# Patient Record
Sex: FEMALE | Race: WHITE | Employment: FULL TIME | URBAN - METROPOLITAN AREA
[De-identification: names, ages, dates, MRNs, and addresses within clinical notes are randomized per-mention and may not be internally consistent; named-entity substitution may affect disease eponyms.]

---

## 2018-07-19 ENCOUNTER — OFFICE VISIT (OUTPATIENT)
Dept: PRIMARY CARE CLINIC | Age: 68
End: 2018-07-19

## 2018-07-19 VITALS
DIASTOLIC BLOOD PRESSURE: 80 MMHG | TEMPERATURE: 99.7 F | RESPIRATION RATE: 16 BRPM | WEIGHT: 166 LBS | HEART RATE: 97 BPM | BODY MASS INDEX: 27.66 KG/M2 | HEIGHT: 65 IN | SYSTOLIC BLOOD PRESSURE: 128 MMHG

## 2018-07-19 DIAGNOSIS — G47.09 OTHER INSOMNIA: ICD-10-CM

## 2018-07-19 DIAGNOSIS — J40 BRONCHITIS: Primary | ICD-10-CM

## 2018-07-19 DIAGNOSIS — I10 ESSENTIAL HYPERTENSION: ICD-10-CM

## 2018-07-19 PROBLEM — J06.9 VIRAL UPPER RESPIRATORY TRACT INFECTION: Status: ACTIVE | Noted: 2018-07-19

## 2018-07-19 PROCEDURE — 99213 OFFICE O/P EST LOW 20 MIN: CPT | Performed by: NURSE PRACTITIONER

## 2018-07-19 RX ORDER — TRAZODONE HYDROCHLORIDE 50 MG/1
50 TABLET ORAL NIGHTLY PRN
COMMUNITY
End: 2018-07-19 | Stop reason: SDUPTHER

## 2018-07-19 RX ORDER — TRAZODONE HYDROCHLORIDE 50 MG/1
50 TABLET ORAL NIGHTLY PRN
Qty: 90 TABLET | Refills: 1 | Status: SHIPPED | OUTPATIENT
Start: 2018-07-19 | End: 2019-04-02 | Stop reason: SDUPTHER

## 2018-07-19 RX ORDER — DOXYCYCLINE HYCLATE 100 MG
100 TABLET ORAL 2 TIMES DAILY
Qty: 20 TABLET | Refills: 0 | Status: SHIPPED | OUTPATIENT
Start: 2018-07-19 | End: 2018-07-29

## 2018-07-19 RX ORDER — AMLODIPINE BESYLATE AND BENAZEPRIL HYDROCHLORIDE 10; 20 MG/1; MG/1
1 CAPSULE ORAL DAILY
Qty: 90 CAPSULE | Refills: 1 | Status: SHIPPED | OUTPATIENT
Start: 2018-07-19 | End: 2019-04-02

## 2018-07-19 ASSESSMENT — ENCOUNTER SYMPTOMS
COUGH: 1
SINUS PAIN: 1
SHORTNESS OF BREATH: 0
NAUSEA: 0
SINUS PRESSURE: 1
BACK PAIN: 1
RHINORRHEA: 1
TROUBLE SWALLOWING: 0
DIARRHEA: 0
SORE THROAT: 0
CHEST TIGHTNESS: 0

## 2018-07-19 ASSESSMENT — PATIENT HEALTH QUESTIONNAIRE - PHQ9
SUM OF ALL RESPONSES TO PHQ QUESTIONS 1-9: 0
1. LITTLE INTEREST OR PLEASURE IN DOING THINGS: 0
SUM OF ALL RESPONSES TO PHQ9 QUESTIONS 1 & 2: 0
2. FEELING DOWN, DEPRESSED OR HOPELESS: 0

## 2018-07-19 NOTE — LETTER
[address] [address]  Phone: [phone]  Fax: [phone]    CANDIE Heller CNP        July 19, 2018     Patient: Christi Mclaughlin   YOB: 1950   Date of Visit: 7/19/2018       To Whom It May Concern: It is my medical opinion that Carlitos Constantino may return to work on 7/20/18. If you have any questions or concerns, please don't hesitate to call.     Sincerely,          CANDIE Heller CNP

## 2018-07-19 NOTE — PROGRESS NOTES
Marvin, DO   predniSONE (DELTASONE) 10 MG tablet Take 1, 2 or 3 tablets daily  Anup Balderas, DO     Current Outpatient Prescriptions   Medication Sig Dispense Refill    doxycycline hyclate (VIBRA-TABS) 100 MG tablet Take 1 tablet by mouth 2 times daily for 10 days 20 tablet 0    amLODIPine-benazepril (LOTREL) 10-20 MG per capsule Take 1 capsule by mouth daily 90 capsule 1    traZODone (DESYREL) 50 MG tablet Take 1 tablet by mouth nightly as needed for Sleep 90 tablet 1    Esomeprazole Magnesium (NEXIUM PO) Take  by mouth.  Ibuprofen (MOTRIN PO) Take 200 mg by mouth daily.  diclofenac (VOLTAREN) 75 MG EC tablet Take 1 tablet by mouth 2 times daily (with meals). 60 tablet 3    predniSONE (DELTASONE) 10 MG tablet Take 1, 2 or 3 tablets daily 90 tablet 3     No current facility-administered medications for this visit.       Health Maintenance   Topic Date Due    Potassium monitoring  1950    Creatinine monitoring  1950    Hepatitis C screen  1950    HIV screen  08/26/1967    DTaP/Tdap/Td vaccine (1 - Tdap) 08/26/1971    Cervical cancer screen  08/26/1973    Lipid screen  08/26/1992    Diabetes screen  08/26/1992    Shingles Vaccine (1 of 2 - 2 Dose Series) 08/26/2002    Colon cancer screen colonoscopy  08/26/2002    DEXA (modify frequency per FRAX score)  08/26/2017    Pneumococcal low/med risk (1 of 2 - PCV13) 08/26/2017    Breast cancer screen  02/28/2018    Flu vaccine (1) 09/01/2018      Social History     Social History    Marital status:      Spouse name: N/A    Number of children: N/A    Years of education: N/A     Occupational History          Social History Main Topics    Smoking status: Never Smoker    Smokeless tobacco: Never Used    Alcohol use 0.6 oz/week     1 Cans of beer per week      Comment: RARELY    Drug use: No    Sexual activity: Not Asked     Other Topics Concern    None     Social History Narrative    None     Family History   Problem Relation Age of Onset    Cancer Mother     High Blood Pressure Mother      Patient Active Problem List   Diagnosis    Knee pain, left    DJD (degenerative joint disease) of knee    Neck pain    Knee pain, right    Viral upper respiratory tract infection    Essential hypertension    Other insomnia        LABS:  No results found for any previous visit. PHYSICAL EXAM  /80 (Site: Right Arm, Position: Sitting)   Pulse 97   Temp 99.7 °F (37.6 °C) (Oral)   Resp 16   Ht 5' 5\" (1.651 m)   Wt 166 lb (75.3 kg)   BMI 27.62 kg/m²     BP Readings from Last 3 Encounters:   07/19/18 128/80       Wt Readings from Last 3 Encounters:   07/19/18 166 lb (75.3 kg)   04/30/14 190 lb (86.2 kg)   02/17/14 194 lb (88 kg)        Physical Exam   Constitutional: She is oriented to person, place, and time. She appears well-developed and well-nourished. HENT:   Head: Normocephalic and atraumatic. Right Ear: External ear normal. No swelling or tenderness. Tympanic membrane is not erythematous, not retracted and not bulging. Left Ear: External ear normal. No swelling or tenderness. Tympanic membrane is not erythematous and not retracted. Nose: Nose normal.   Mouth/Throat: Oropharynx is clear and moist.   Eyes: Conjunctivae and EOM are normal. Pupils are equal, round, and reactive to light. Neck: Normal range of motion. Neck supple. No JVD present. Carotid bruit is not present. No thyromegaly present. Cardiovascular: Normal rate, regular rhythm, S1 normal, S2 normal, normal heart sounds and intact distal pulses. Exam reveals no friction rub. No murmur heard. Pulmonary/Chest: Effort normal and breath sounds normal. No respiratory distress. She has no wheezes. She has no rales. She exhibits no tenderness. Abdominal: Soft. Bowel sounds are normal. She exhibits no distension. Musculoskeletal: Normal range of motion. She exhibits no edema, tenderness or deformity.    Lymphadenopathy:

## 2018-07-26 VITALS
HEART RATE: 78 BPM | WEIGHT: 159 LBS | HEIGHT: 65 IN | SYSTOLIC BLOOD PRESSURE: 126 MMHG | BODY MASS INDEX: 26.49 KG/M2 | TEMPERATURE: 98.8 F | RESPIRATION RATE: 12 BRPM | DIASTOLIC BLOOD PRESSURE: 80 MMHG

## 2018-07-26 RX ORDER — ACETAMINOPHEN 160 MG
4000 TABLET,DISINTEGRATING ORAL DAILY
COMMUNITY

## 2018-08-20 ENCOUNTER — OFFICE VISIT (OUTPATIENT)
Dept: PRIMARY CARE CLINIC | Age: 68
End: 2018-08-20

## 2018-08-20 VITALS
WEIGHT: 168 LBS | RESPIRATION RATE: 16 BRPM | HEIGHT: 65 IN | DIASTOLIC BLOOD PRESSURE: 78 MMHG | SYSTOLIC BLOOD PRESSURE: 118 MMHG | HEART RATE: 76 BPM | OXYGEN SATURATION: 98 % | BODY MASS INDEX: 27.99 KG/M2 | TEMPERATURE: 98.4 F

## 2018-08-20 DIAGNOSIS — J20.8 ACUTE BRONCHITIS DUE TO OTHER SPECIFIED ORGANISMS: Primary | ICD-10-CM

## 2018-08-20 PROCEDURE — 99213 OFFICE O/P EST LOW 20 MIN: CPT | Performed by: NURSE PRACTITIONER

## 2018-08-20 RX ORDER — PREDNISONE 20 MG/1
TABLET ORAL
Qty: 15 TABLET | Refills: 0 | Status: SHIPPED | OUTPATIENT
Start: 2018-08-20 | End: 2018-11-29 | Stop reason: CLARIF

## 2018-08-20 RX ORDER — BENZONATATE 100 MG/1
100 CAPSULE ORAL 3 TIMES DAILY PRN
Qty: 15 CAPSULE | Refills: 0 | Status: SHIPPED | OUTPATIENT
Start: 2018-08-20 | End: 2018-08-27

## 2018-08-20 ASSESSMENT — ENCOUNTER SYMPTOMS
SHORTNESS OF BREATH: 0
CHEST TIGHTNESS: 0
DIARRHEA: 0
ABDOMINAL PAIN: 0
COUGH: 1
NAUSEA: 0
TROUBLE SWALLOWING: 0
CONSTIPATION: 0

## 2018-08-20 NOTE — PROGRESS NOTES
CANDIE Douglas - CNP   diclofenac (VOLTAREN) 75 MG EC tablet Take 1 tablet by mouth 2 times daily (with meals). Yes Rey Wong, DO   Esomeprazole Magnesium (NEXIUM PO) Take  by mouth. Historical Provider, MD   Ibuprofen (MOTRIN PO) Take 200 mg by mouth daily. Historical Provider, MD     Current Outpatient Prescriptions   Medication Sig Dispense Refill    predniSONE (DELTASONE) 20 MG tablet 2 tab daily x 5 days then 1 tab daily x5 15 tablet 0    benzonatate (TESSALON PERLES) 100 MG capsule Take 1 capsule by mouth 3 times daily as needed for Cough 15 capsule 0    TRAMADOL HCL PO Take by mouth. [place] Cholecalciferol (VITAMIN D3) 2000 units CAPS Take by mouth      amLODIPine-benazepril (LOTREL) 10-20 MG per capsule Take 1 capsule by mouth daily 90 capsule 1    traZODone (DESYREL) 50 MG tablet Take 1 tablet by mouth nightly as needed for Sleep 90 tablet 1    diclofenac (VOLTAREN) 75 MG EC tablet Take 1 tablet by mouth 2 times daily (with meals). 60 tablet 3    Esomeprazole Magnesium (NEXIUM PO) Take  by mouth.  Ibuprofen (MOTRIN PO) Take 200 mg by mouth daily. No current facility-administered medications for this visit.       Health Maintenance   Topic Date Due    Potassium monitoring  1950    Creatinine monitoring  1950    Hepatitis C screen  1950    HIV screen  08/26/1967    Cervical cancer screen  08/26/1973    Lipid screen  08/26/1992    Diabetes screen  08/26/1992    Colon cancer screen colonoscopy  08/26/2002    Shingles Vaccine (2 of 2 - 2 Dose Series) 04/10/2015    DEXA (modify frequency per FRAX score)  08/26/2017    Breast cancer screen  02/28/2018    Flu vaccine (1) 09/01/2018    Pneumococcal low/med risk (2 of 2 - PPSV23) 10/23/2018    DTaP/Tdap/Td vaccine (2 - Td) 10/13/2024      Social History     Social History    Marital status:      Spouse name: N/A    Number of children: N/A    Years of education: N/A     Occupational History          Social History Main Topics    Smoking status: Never Smoker    Smokeless tobacco: Never Used    Alcohol use 0.6 oz/week     1 Cans of beer per week      Comment: RARELY    Drug use: No    Sexual activity: Not Asked     Other Topics Concern    None     Social History Narrative    None     Family History   Problem Relation Age of Onset    Cancer Mother     High Blood Pressure Mother      Patient Active Problem List   Diagnosis    Knee pain, left    DJD (degenerative joint disease) of knee    Neck pain    Knee pain, right    Viral upper respiratory tract infection    Essential hypertension    Other insomnia        LABS:  No results found for any previous visit. PHYSICAL EXAM  /78 (Site: Right Arm, Position: Sitting)   Pulse 76   Temp 98.4 °F (36.9 °C)   Resp 16   Ht 5' 5\" (1.651 m)   Wt 168 lb (76.2 kg)   SpO2 98%   BMI 27.96 kg/m²     BP Readings from Last 3 Encounters:   08/20/18 118/78   12/21/17 126/80   07/19/18 128/80       Wt Readings from Last 3 Encounters:   08/20/18 168 lb (76.2 kg)   12/21/17 159 lb (72.1 kg)   07/19/18 166 lb (75.3 kg)        Physical Exam   Constitutional: She is oriented to person, place, and time. She appears well-developed and well-nourished. HENT:   Head: Normocephalic and atraumatic. Right Ear: Tympanic membrane, external ear and ear canal normal.   Left Ear: Tympanic membrane, external ear and ear canal normal.   Mouth/Throat: Uvula is midline, oropharynx is clear and moist and mucous membranes are normal.   Eyes: Pupils are equal, round, and reactive to light. Conjunctivae and EOM are normal.   Neck: Normal range of motion. Neck supple. Cardiovascular: Normal rate, regular rhythm and normal heart sounds. Pulmonary/Chest: Effort normal and breath sounds normal. No respiratory distress. She has no wheezes. She has no rales. She exhibits no tenderness. Musculoskeletal: Normal range of motion.    Neurological: She is alert and oriented to person, place, and time. Skin: Skin is warm and dry. Psychiatric: She has a normal mood and affect. Her behavior is normal.   Nursing note and vitals reviewed. ASSESSMENT/PLAN:  Lo Salgado was seen today for other. Diagnoses and all orders for this visit:    Acute bronchitis due to other specified organisms  -     predniSONE (DELTASONE) 20 MG tablet; 2 tab daily x 5 days then 1 tab daily x5  -     benzonatate (TESSALON PERLES) 100 MG capsule; Take 1 capsule by mouth 3 times daily as needed for Cough        Return if symptoms worsen or fail to improve. Reviewed and/or ordered clinical lab results No  Reviewed and/or ordered radiology tests No  Reviewed and/or ordered other diagnostic tests No  Discussed test results with performing physician No  Independently reviewed image, tracing, or specimen No  Made a decision to obtain old records No  Reviewed and summarized old records Yes      Seun Chaves was counseled regarding symptoms of current diagnosis, course and complications of disease if inadequately treated, side effects of medications, diagnosis, treatment options, and prognosis, risks, benefits, complications, and alternatives of treatment, labs, imaging and other studies and treatment targets and goals. She understands instructions and counseling. This dictation was performed with a verbal recognition program (DRAGON) and it was checked for errors. It is possible that there are still dictated errors within this office note. If so, please bring any errors to my attention for an addendum. All efforts were made to ensure that this office note is accurate.

## 2018-11-29 ENCOUNTER — INITIAL CONSULT (OUTPATIENT)
Dept: PRIMARY CARE CLINIC | Age: 68
End: 2018-11-29
Payer: COMMERCIAL

## 2018-11-29 VITALS
DIASTOLIC BLOOD PRESSURE: 70 MMHG | TEMPERATURE: 98.2 F | WEIGHT: 167 LBS | HEART RATE: 84 BPM | SYSTOLIC BLOOD PRESSURE: 110 MMHG | HEIGHT: 65 IN | RESPIRATION RATE: 14 BRPM | BODY MASS INDEX: 27.82 KG/M2

## 2018-11-29 DIAGNOSIS — M17.0 PRIMARY OSTEOARTHRITIS OF BOTH KNEES: ICD-10-CM

## 2018-11-29 DIAGNOSIS — Z23 NEED FOR 23-POLYVALENT PNEUMOCOCCAL POLYSACCHARIDE VACCINE: ICD-10-CM

## 2018-11-29 DIAGNOSIS — Z01.818 PRE-OP EXAMINATION: ICD-10-CM

## 2018-11-29 DIAGNOSIS — M48.061 NEURAL FORAMINAL STENOSIS OF LUMBAR SPINE: ICD-10-CM

## 2018-11-29 DIAGNOSIS — I10 ESSENTIAL HYPERTENSION: ICD-10-CM

## 2018-11-29 DIAGNOSIS — M48.061 SPINAL STENOSIS, LUMBAR REGION, WITHOUT NEUROGENIC CLAUDICATION: Primary | ICD-10-CM

## 2018-11-29 DIAGNOSIS — M41.50 DEGENERATIVE SCOLIOSIS: ICD-10-CM

## 2018-11-29 DIAGNOSIS — G47.09 OTHER INSOMNIA: ICD-10-CM

## 2018-11-29 LAB
A/G RATIO: 2 (ref 1.1–2.2)
ALBUMIN SERPL-MCNC: 4.3 G/DL (ref 3.4–5)
ALP BLD-CCNC: 95 U/L (ref 40–129)
ALT SERPL-CCNC: 20 U/L (ref 10–40)
ANION GAP SERPL CALCULATED.3IONS-SCNC: 12 MMOL/L (ref 3–16)
AST SERPL-CCNC: 23 U/L (ref 15–37)
BASOPHILS ABSOLUTE: 0 K/UL (ref 0–0.2)
BASOPHILS RELATIVE PERCENT: 0.7 %
BILIRUB SERPL-MCNC: 0.6 MG/DL (ref 0–1)
BILIRUBIN, POC: NEGATIVE
BLOOD URINE, POC: NORMAL
BUN BLDV-MCNC: 15 MG/DL (ref 7–20)
CALCIUM SERPL-MCNC: 10.5 MG/DL (ref 8.3–10.6)
CHLORIDE BLD-SCNC: 111 MMOL/L (ref 99–110)
CLARITY, POC: CLEAR
CO2: 22 MMOL/L (ref 21–32)
COLOR, POC: YELLOW
CREAT SERPL-MCNC: 1.1 MG/DL (ref 0.6–1.2)
EOSINOPHILS ABSOLUTE: 0.2 K/UL (ref 0–0.6)
EOSINOPHILS RELATIVE PERCENT: 3.7 %
GFR AFRICAN AMERICAN: >60
GFR NON-AFRICAN AMERICAN: 50
GLOBULIN: 2.1 G/DL
GLUCOSE BLD-MCNC: 87 MG/DL (ref 70–99)
GLUCOSE URINE, POC: NEGATIVE
HCT VFR BLD CALC: 38.3 % (ref 36–48)
HEMOGLOBIN: 12.9 G/DL (ref 12–16)
KETONES, POC: NEGATIVE
LEUKOCYTE EST, POC: NEGATIVE
LYMPHOCYTES ABSOLUTE: 0.8 K/UL (ref 1–5.1)
LYMPHOCYTES RELATIVE PERCENT: 16.6 %
MCH RBC QN AUTO: 32.6 PG (ref 26–34)
MCHC RBC AUTO-ENTMCNC: 33.8 G/DL (ref 31–36)
MCV RBC AUTO: 96.3 FL (ref 80–100)
MONOCYTES ABSOLUTE: 0.4 K/UL (ref 0–1.3)
MONOCYTES RELATIVE PERCENT: 9.1 %
NEUTROPHILS ABSOLUTE: 3.4 K/UL (ref 1.7–7.7)
NEUTROPHILS RELATIVE PERCENT: 69.9 %
NITRITE, POC: NEGATIVE
PDW BLD-RTO: 13.1 % (ref 12.4–15.4)
PH, POC: 6
PLATELET # BLD: 141 K/UL (ref 135–450)
PMV BLD AUTO: 8.3 FL (ref 5–10.5)
POTASSIUM SERPL-SCNC: 4 MMOL/L (ref 3.5–5.1)
PROTEIN, POC: NEGATIVE
RBC # BLD: 3.97 M/UL (ref 4–5.2)
SODIUM BLD-SCNC: 145 MMOL/L (ref 136–145)
SPECIFIC GRAVITY, POC: 1.02
TOTAL PROTEIN: 6.4 G/DL (ref 6.4–8.2)
UROBILINOGEN, POC: 0.2
WBC # BLD: 4.9 K/UL (ref 4–11)

## 2018-11-29 PROCEDURE — 90471 IMMUNIZATION ADMIN: CPT | Performed by: NURSE PRACTITIONER

## 2018-11-29 PROCEDURE — 90732 PPSV23 VACC 2 YRS+ SUBQ/IM: CPT | Performed by: NURSE PRACTITIONER

## 2018-11-29 PROCEDURE — 81002 URINALYSIS NONAUTO W/O SCOPE: CPT | Performed by: NURSE PRACTITIONER

## 2018-11-29 PROCEDURE — 99213 OFFICE O/P EST LOW 20 MIN: CPT | Performed by: NURSE PRACTITIONER

## 2018-11-29 PROCEDURE — 93000 ELECTROCARDIOGRAM COMPLETE: CPT | Performed by: NURSE PRACTITIONER

## 2018-11-29 ASSESSMENT — ENCOUNTER SYMPTOMS
ABDOMINAL PAIN: 0
CHEST TIGHTNESS: 0
BACK PAIN: 1
SHORTNESS OF BREATH: 0
DIARRHEA: 1
TROUBLE SWALLOWING: 0
COUGH: 0
VOMITING: 0
CONSTIPATION: 0
NAUSEA: 0

## 2018-11-30 DIAGNOSIS — I10 ESSENTIAL HYPERTENSION: Primary | ICD-10-CM

## 2018-11-30 RX ORDER — BENAZEPRIL HYDROCHLORIDE 10 MG/1
10 TABLET ORAL DAILY
Qty: 90 TABLET | Refills: 0 | Status: SHIPPED | OUTPATIENT
Start: 2018-11-30 | End: 2019-03-22 | Stop reason: SDUPTHER

## 2018-11-30 RX ORDER — AMLODIPINE BESYLATE 10 MG/1
10 TABLET ORAL DAILY
Qty: 90 TABLET | Refills: 0 | Status: SHIPPED | OUTPATIENT
Start: 2018-11-30 | End: 2019-03-22 | Stop reason: SDUPTHER

## 2018-12-01 LAB — URINE CULTURE, ROUTINE: NORMAL

## 2018-12-03 ENCOUNTER — HOSPITAL ENCOUNTER (OUTPATIENT)
Dept: MAMMOGRAPHY | Age: 68
Setting detail: THERAPIES SERIES
Discharge: HOME | End: 2018-12-03
Payer: COMMERCIAL

## 2018-12-03 DIAGNOSIS — Z12.31 ENCOUNTER FOR SCREENING MAMMOGRAM FOR BREAST CANCER: ICD-10-CM

## 2018-12-03 PROCEDURE — 77067 SCR MAMMO BI INCL CAD: CPT

## 2018-12-04 ENCOUNTER — TELEPHONE (OUTPATIENT)
Dept: FAMILY MEDICINE CLINIC | Age: 68
End: 2018-12-04

## 2018-12-05 LAB
HCT VFR BLD CALC: 28.1 % (ref 35.8–46.5)
HEMOGLOBIN: 9.8 G/DL (ref 12.1–15.8)

## 2018-12-06 LAB
ANION GAP SERPL CALCULATED.3IONS-SCNC: 8 MMOL/L (ref 7–16)
BUN BLDV-MCNC: 16 MG/DL (ref 7–18)
CALCIUM SERPL-MCNC: 9.2 MG/DL (ref 8.5–10.1)
CHLORIDE BLD-SCNC: 112 MMOL/L (ref 98–107)
CO2: 23 MMOL/L (ref 21–32)
CREATININE + EGFR PANEL: 1.4 MG/DL (ref 0.6–1.3)
GFR AFRICAN AMERICAN: 46 ML/MIN/1.73M2
GFR NON-AFRICAN AMERICAN: 38 ML/MIN/1.73M2
GLUCOSE: 96 MG/DL (ref 74–106)
HCT VFR BLD CALC: 26.8 % (ref 35.8–46.5)
HEMOGLOBIN: 9.1 G/DL (ref 12.1–15.8)
MCH RBC QN AUTO: 32.3 PG (ref 28.4–33.4)
MCHC RBC AUTO-ENTMCNC: 34 G/DL (ref 31.1–37)
MCV RBC AUTO: 94.9 FL (ref 85–99)
PDW BLD-RTO: 13.1 % (ref 11.7–15.2)
PLATELET # BLD: 159 K/UL (ref 154–393)
POTASSIUM SERPL-SCNC: 3.7 MMOL/L (ref 3.5–5.1)
RBC # BLD: 2.83 M/UL (ref 3.86–5.17)
SODIUM BLD-SCNC: 143 MMOL/L (ref 136–145)
WBC # BLD: 8.3 K/UL (ref 4–10.5)

## 2018-12-07 LAB
LAB AP CASE REPORT: NORMAL
LAB AP CLINICAL DIAGNOSIS: NORMAL
Lab: NORMAL
PATHOLOGY REPORT FINAL DIAGNOSIS: NORMAL

## 2019-01-21 DIAGNOSIS — I10 ESSENTIAL HYPERTENSION: ICD-10-CM

## 2019-01-21 LAB
ANION GAP SERPL CALCULATED.3IONS-SCNC: 14 MMOL/L (ref 3–16)
BUN BLDV-MCNC: 12 MG/DL (ref 7–20)
CALCIUM SERPL-MCNC: 9.9 MG/DL (ref 8.3–10.6)
CHLORIDE BLD-SCNC: 109 MMOL/L (ref 99–110)
CO2: 22 MMOL/L (ref 21–32)
CREAT SERPL-MCNC: 1.1 MG/DL (ref 0.6–1.2)
GFR AFRICAN AMERICAN: >60
GFR NON-AFRICAN AMERICAN: 50
GLUCOSE BLD-MCNC: 91 MG/DL (ref 70–99)
POTASSIUM SERPL-SCNC: 4.7 MMOL/L (ref 3.5–5.1)
SODIUM BLD-SCNC: 145 MMOL/L (ref 136–145)

## 2019-03-12 ENCOUNTER — OFFICE VISIT (OUTPATIENT)
Dept: PRIMARY CARE CLINIC | Age: 69
End: 2019-03-12
Payer: COMMERCIAL

## 2019-03-12 VITALS
DIASTOLIC BLOOD PRESSURE: 84 MMHG | TEMPERATURE: 97.9 F | WEIGHT: 174 LBS | BODY MASS INDEX: 28.96 KG/M2 | SYSTOLIC BLOOD PRESSURE: 108 MMHG

## 2019-03-12 DIAGNOSIS — R42 VERTIGO: Primary | ICD-10-CM

## 2019-03-12 DIAGNOSIS — R05.3 CHRONIC COUGH: ICD-10-CM

## 2019-03-12 PROCEDURE — 99213 OFFICE O/P EST LOW 20 MIN: CPT | Performed by: NURSE PRACTITIONER

## 2019-03-12 RX ORDER — MECLIZINE HYDROCHLORIDE 25 MG/1
25 TABLET ORAL 3 TIMES DAILY PRN
Qty: 20 TABLET | Refills: 0 | Status: SHIPPED | OUTPATIENT
Start: 2019-03-12 | End: 2019-03-22

## 2019-03-12 RX ORDER — PREDNISONE 20 MG/1
20 TABLET ORAL DAILY
Qty: 10 TABLET | Refills: 0 | Status: SHIPPED | OUTPATIENT
Start: 2019-03-12 | End: 2019-03-22

## 2019-03-12 RX ORDER — FLUTICASONE PROPIONATE 50 MCG
2 SPRAY, SUSPENSION (ML) NASAL DAILY
Qty: 1 BOTTLE | Refills: 0 | Status: SHIPPED | OUTPATIENT
Start: 2019-03-12

## 2019-03-12 ASSESSMENT — ENCOUNTER SYMPTOMS
RHINORRHEA: 0
NAUSEA: 0
CHEST TIGHTNESS: 0
SINUS PAIN: 0
SINUS PRESSURE: 0
CONSTIPATION: 0
SHORTNESS OF BREATH: 0
ABDOMINAL DISTENTION: 0
TROUBLE SWALLOWING: 0
FACIAL SWELLING: 0
ABDOMINAL PAIN: 0
COUGH: 1
DIARRHEA: 0
WHEEZING: 0

## 2019-03-21 DIAGNOSIS — I10 ESSENTIAL HYPERTENSION: ICD-10-CM

## 2019-03-21 RX ORDER — BENAZEPRIL HYDROCHLORIDE 10 MG/1
10 TABLET ORAL DAILY
Qty: 90 TABLET | Refills: 0 | OUTPATIENT
Start: 2019-03-21

## 2019-03-21 RX ORDER — AMLODIPINE BESYLATE 10 MG/1
10 TABLET ORAL DAILY
Qty: 90 TABLET | Refills: 0 | OUTPATIENT
Start: 2019-03-21

## 2019-03-22 DIAGNOSIS — I10 ESSENTIAL HYPERTENSION: ICD-10-CM

## 2019-03-22 RX ORDER — BENAZEPRIL HYDROCHLORIDE 10 MG/1
10 TABLET ORAL DAILY
Qty: 90 TABLET | Refills: 0 | Status: SHIPPED | OUTPATIENT
Start: 2019-03-22 | End: 2019-04-02 | Stop reason: SDUPTHER

## 2019-03-22 RX ORDER — AMLODIPINE BESYLATE 10 MG/1
10 TABLET ORAL DAILY
Qty: 90 TABLET | Refills: 0 | Status: SHIPPED | OUTPATIENT
Start: 2019-03-22 | End: 2019-04-02 | Stop reason: SDUPTHER

## 2019-04-02 ENCOUNTER — OFFICE VISIT (OUTPATIENT)
Dept: PRIMARY CARE CLINIC | Age: 69
End: 2019-04-02
Payer: COMMERCIAL

## 2019-04-02 VITALS
OXYGEN SATURATION: 98 % | SYSTOLIC BLOOD PRESSURE: 116 MMHG | DIASTOLIC BLOOD PRESSURE: 78 MMHG | TEMPERATURE: 98.2 F | HEART RATE: 81 BPM | BODY MASS INDEX: 29.35 KG/M2 | WEIGHT: 176.4 LBS

## 2019-04-02 DIAGNOSIS — N18.2 CHRONIC RENAL IMPAIRMENT, STAGE 2 (MILD): ICD-10-CM

## 2019-04-02 DIAGNOSIS — I10 ESSENTIAL HYPERTENSION: Primary | ICD-10-CM

## 2019-04-02 DIAGNOSIS — H69.81 DYSFUNCTION OF RIGHT EUSTACHIAN TUBE: ICD-10-CM

## 2019-04-02 DIAGNOSIS — G47.09 OTHER INSOMNIA: ICD-10-CM

## 2019-04-02 PROCEDURE — 99214 OFFICE O/P EST MOD 30 MIN: CPT | Performed by: NURSE PRACTITIONER

## 2019-04-02 RX ORDER — BENAZEPRIL HYDROCHLORIDE 10 MG/1
10 TABLET ORAL DAILY
Qty: 90 TABLET | Refills: 1 | Status: SHIPPED | OUTPATIENT
Start: 2019-04-02

## 2019-04-02 RX ORDER — PREDNISONE 20 MG/1
20 TABLET ORAL DAILY
Qty: 7 TABLET | Refills: 0 | Status: SHIPPED | OUTPATIENT
Start: 2019-04-02 | End: 2019-04-09

## 2019-04-02 RX ORDER — AMLODIPINE BESYLATE 10 MG/1
10 TABLET ORAL DAILY
Qty: 90 TABLET | Refills: 1 | Status: SHIPPED | OUTPATIENT
Start: 2019-04-02 | End: 2019-09-25 | Stop reason: SDUPTHER

## 2019-04-02 RX ORDER — TRAZODONE HYDROCHLORIDE 50 MG/1
50 TABLET ORAL NIGHTLY PRN
Qty: 90 TABLET | Refills: 1 | Status: SHIPPED | OUTPATIENT
Start: 2019-04-02

## 2019-04-02 ASSESSMENT — ENCOUNTER SYMPTOMS
ABDOMINAL DISTENTION: 0
ABDOMINAL PAIN: 0
CONSTIPATION: 0
TROUBLE SWALLOWING: 0
DIARRHEA: 0
NAUSEA: 0
CHEST TIGHTNESS: 0
COUGH: 0
SHORTNESS OF BREATH: 0
WHEEZING: 0

## 2019-04-02 ASSESSMENT — PATIENT HEALTH QUESTIONNAIRE - PHQ9
2. FEELING DOWN, DEPRESSED OR HOPELESS: 1
1. LITTLE INTEREST OR PLEASURE IN DOING THINGS: 1
SUM OF ALL RESPONSES TO PHQ9 QUESTIONS 1 & 2: 2
SUM OF ALL RESPONSES TO PHQ QUESTIONS 1-9: 2
SUM OF ALL RESPONSES TO PHQ QUESTIONS 1-9: 2

## 2019-04-02 NOTE — PROGRESS NOTES
4/2/2019    Chief Complaint   Patient presents with    Hypertension     f/u, needs refills, doesn't check bp's at home, last EKG 11/29/18    Insomnia     f/u, needs refills    Otalgia     states right ear has been very painful, shoots through jaw, very sore to press down on jaw line, started a week ago       HPI:  Rosemary Lei is here for follow up with HTN and insomnia. She does not monitor blood pressure at home. She states that if Trazodone is missed she awakens frequently, if taken sleeps 7-8 hrs. She denies daytime drowsiness. Denies chest pain, shortness of breath, headaches, fatigue or edema. She reports intermittent dizziness. She is reporting right ear pain radiating into jaw and popping /pressure for last week. Has been using Flonase and Zyrtec. Review of Systems   Constitutional: Negative for chills, fatigue and fever. HENT: Positive for ear pain (right). Negative for congestion, postnasal drip and trouble swallowing. Eyes: Negative for visual disturbance. Respiratory: Negative for cough, chest tightness, shortness of breath and wheezing. Cardiovascular: Negative for chest pain, palpitations and leg swelling. Gastrointestinal: Negative for abdominal distention, abdominal pain, constipation, diarrhea and nausea. Endocrine: Negative for cold intolerance, heat intolerance, polydipsia, polyphagia and polyuria. Genitourinary: Negative for difficulty urinating, dysuria and urgency. Musculoskeletal: Negative. Neurological: Negative for dizziness, weakness, light-headedness and headaches. Psychiatric/Behavioral: Negative for decreased concentration, dysphoric mood and sleep disturbance. The patient is not nervous/anxious.         Allergies   Allergen Reactions    Celebrex [Celecoxib]     Doxycycline Hyclate Other (See Comments)     Nightmares, diarrhea    Penicillins Other (See Comments)     Unknown reaction - told she was allergic as a child  As a child     Prior to Visit Medications 01/21/2020    Breast cancer screen  12/03/2020    DTaP/Tdap/Td vaccine (2 - Td) 10/13/2024    Pneumococcal 65+ years Vaccine  Completed      Social History     Socioeconomic History    Marital status:      Spouse name: None    Number of children: None    Years of education: None    Highest education level: None   Occupational History    Occupation:    Social Needs    Financial resource strain: None    Food insecurity:     Worry: None     Inability: None    Transportation needs:     Medical: None     Non-medical: None   Tobacco Use    Smoking status: Never Smoker    Smokeless tobacco: Never Used   Substance and Sexual Activity    Alcohol use:  Yes     Alcohol/week: 0.6 oz     Types: 1 Cans of beer per week     Comment: RARELY    Drug use: No    Sexual activity: None   Lifestyle    Physical activity:     Days per week: None     Minutes per session: None    Stress: None   Relationships    Social connections:     Talks on phone: None     Gets together: None     Attends Nondenominational service: None     Active member of club or organization: None     Attends meetings of clubs or organizations: None     Relationship status: None    Intimate partner violence:     Fear of current or ex partner: None     Emotionally abused: None     Physically abused: None     Forced sexual activity: None   Other Topics Concern    None   Social History Narrative    None     Family History   Problem Relation Age of Onset    Cancer Mother     High Blood Pressure Mother      Patient Active Problem List   Diagnosis    Knee pain, left    DJD (degenerative joint disease) of knee    Neck pain    Knee pain, right    Viral upper respiratory tract infection    Essential hypertension    Other insomnia    Spinal stenosis, lumbar region, without neurogenic claudication    Neural foraminal stenosis of lumbar spine    Degenerative scoliosis        LABS:  Orders Only on 01/21/2019   Component Date Value Ref Range Status    Sodium 01/21/2019 145  136 - 145 mmol/L Final    Potassium 01/21/2019 4.7  3.5 - 5.1 mmol/L Final    Chloride 01/21/2019 109  99 - 110 mmol/L Final    CO2 01/21/2019 22  21 - 32 mmol/L Final    Anion Gap 01/21/2019 14  3 - 16 Final    Glucose 01/21/2019 91  70 - 99 mg/dL Final    BUN 01/21/2019 12  7 - 20 mg/dL Final    CREATININE 01/21/2019 1.1  0.6 - 1.2 mg/dL Final    GFR Non- 01/21/2019 50* >60 Final    Comment: >60 mL/min/1.73m2 EGFR, calc. for ages 25 and older using the  MDRD formula (not corrected for weight), is valid for stable  renal function.  GFR  01/21/2019 >60  >60 Final    Comment: Chronic Kidney Disease: less than 60 ml/min/1.73 sq.m. Kidney Failure: less than 15 ml/min/1.73 sq.m. Results valid for patients 18 years and older.  Calcium 01/21/2019 9.9  8.3 - 10.6 mg/dL Final          PHYSICAL EXAM  /78 (Site: Right Upper Arm, Position: Sitting, Cuff Size: Medium Adult)   Pulse 81   Temp 98.2 °F (36.8 °C) (Oral)   Wt 176 lb 6.4 oz (80 kg)   SpO2 98%   BMI 29.35 kg/m²     BP Readings from Last 3 Encounters:   04/02/19 116/78   03/12/19 108/84   11/29/18 110/70       Wt Readings from Last 3 Encounters:   04/02/19 176 lb 6.4 oz (80 kg)   03/12/19 174 lb (78.9 kg)   11/29/18 167 lb (75.8 kg)        Physical Exam   Constitutional: She is oriented to person, place, and time. She appears well-developed and well-nourished. HENT:   Head: Normocephalic. Right Ear: External ear and ear canal normal. Tympanic membrane is bulging. Left Ear: Tympanic membrane, external ear and ear canal normal.   Nose: Nose normal.   Mouth/Throat: Uvula is midline and oropharynx is clear and moist.   Eyes: Pupils are equal, round, and reactive to light. Conjunctivae, EOM and lids are normal. No scleral icterus. Neck: Normal range of motion. Neck supple. No JVD present. Carotid bruit is not present. No thyromegaly present. ordered clinicallab results Yes  Reviewed and/or ordered radiology tests No  Reviewed and/or ordered other diagnostic tests No  Discussed test results with performing physicianNo  Independently reviewed image, tracing, or specimen No  Made a decision to obtain old records No  Reviewed and summarized old records Yes      Chaz Zabala was counseled regarding symptoms of current diagnosis, course and complications of disease if inadequately treated, side effects of medications, diagnosis, treatment options, andprognosis, risks, benefits, complications, and alternatives of treatment, labs, imaging and other studies and treatment targets and goals. She understands instructions and counseling. This dictation was performed with a verbal recognition program (DRAGON) and it was checked for errors. It is possible that there are still dictated errors within this office note. If so, pleasebring any errors to my attention for an addendum. All efforts were made to ensure that this office note is accurate.

## 2019-09-25 DIAGNOSIS — I10 ESSENTIAL HYPERTENSION: ICD-10-CM

## 2019-09-26 RX ORDER — AMLODIPINE BESYLATE 10 MG/1
TABLET ORAL
Qty: 30 TABLET | Refills: 0 | Status: SHIPPED | OUTPATIENT
Start: 2019-09-26

## 2019-11-29 ENCOUNTER — HOSPITAL ENCOUNTER (OUTPATIENT)
Dept: NUCLEAR MEDICINE | Age: 69
Discharge: HOME | End: 2019-11-29
Payer: COMMERCIAL

## 2019-11-29 DIAGNOSIS — E21.2 OTHER HYPERPARATHYROIDISM (HCC): ICD-10-CM

## 2019-11-29 PROCEDURE — 78072 PARATHYRD PLANAR W/SPECT&CT: CPT

## 2019-11-29 PROCEDURE — 3430000000 HC RX DIAGNOSTIC RADIOPHARMACEUTICAL: Performed by: NURSE PRACTITIONER

## 2019-11-29 PROCEDURE — A9500 TC99M SESTAMIBI: HCPCS | Performed by: NURSE PRACTITIONER

## 2019-11-29 RX ADMIN — Medication 20.05 MILLICURIE: at 10:15

## 2020-01-17 ENCOUNTER — OFFICE VISIT (OUTPATIENT)
Dept: ENT CLINIC | Age: 70
End: 2020-01-17
Payer: COMMERCIAL

## 2020-01-17 VITALS
HEIGHT: 64 IN | SYSTOLIC BLOOD PRESSURE: 139 MMHG | WEIGHT: 174 LBS | BODY MASS INDEX: 29.71 KG/M2 | DIASTOLIC BLOOD PRESSURE: 94 MMHG | HEART RATE: 82 BPM

## 2020-01-17 PROBLEM — T78.40XA HYPERSENSITIVITY: Status: ACTIVE | Noted: 2019-10-01

## 2020-01-17 PROBLEM — K21.9 ESOPHAGEAL REFLUX: Status: ACTIVE | Noted: 2020-01-17

## 2020-01-17 PROBLEM — Z86.0100 HISTORY OF COLONIC POLYPS: Status: ACTIVE | Noted: 2020-01-17

## 2020-01-17 PROBLEM — Z79.899 OTHER LONG TERM (CURRENT) DRUG THERAPY: Status: ACTIVE | Noted: 2020-01-17

## 2020-01-17 PROBLEM — E21.3 HYPERPARATHYROIDISM (HCC): Status: ACTIVE | Noted: 2020-01-17

## 2020-01-17 PROBLEM — D35.1 PARATHYROID ADENOMA: Status: ACTIVE | Noted: 2020-01-17

## 2020-01-17 PROBLEM — J30.9 ALLERGIC RHINITIS: Status: ACTIVE | Noted: 2020-01-17

## 2020-01-17 PROBLEM — E55.9 VITAMIN D DEFICIENCY: Status: ACTIVE | Noted: 2020-01-17

## 2020-01-17 PROBLEM — J06.9 VIRAL UPPER RESPIRATORY TRACT INFECTION: Status: RESOLVED | Noted: 2018-07-19 | Resolved: 2020-01-17

## 2020-01-17 PROBLEM — E66.09 OBESITY DUE TO EXCESS CALORIES: Status: ACTIVE | Noted: 2020-01-17

## 2020-01-17 PROBLEM — N18.30 CHRONIC RENAL INSUFFICIENCY, STAGE III (MODERATE) (HCC): Status: ACTIVE | Noted: 2019-04-02

## 2020-01-17 PROBLEM — F33.0 MILD RECURRENT MAJOR DEPRESSION (HCC): Status: ACTIVE | Noted: 2020-01-17

## 2020-01-17 PROBLEM — G47.00 INSOMNIA: Status: ACTIVE | Noted: 2018-07-19

## 2020-01-17 PROBLEM — E83.52 HYPERCALCEMIA: Status: ACTIVE | Noted: 2020-01-17

## 2020-01-17 PROBLEM — E66.9 OBESITY WITH BODY MASS INDEX 30 OR GREATER: Status: ACTIVE | Noted: 2020-01-17

## 2020-01-17 PROBLEM — Z86.010 HISTORY OF COLONIC POLYPS: Status: ACTIVE | Noted: 2020-01-17

## 2020-01-17 PROCEDURE — 99204 OFFICE O/P NEW MOD 45 MIN: CPT | Performed by: OTOLARYNGOLOGY

## 2020-01-17 RX ORDER — OXYCODONE HYDROCHLORIDE AND ACETAMINOPHEN 5; 325 MG/1; MG/1
5-325 TABLET ORAL EVERY 6 HOURS PRN
COMMUNITY
Start: 2019-10-23

## 2020-01-17 RX ORDER — LORATADINE 10 MG/1
10 CAPSULE, LIQUID FILLED ORAL DAILY
COMMUNITY

## 2020-01-17 NOTE — PROGRESS NOTES
Emotionally abused: Not on file     Physically abused: Not on file     Forced sexual activity: Not on file   Other Topics Concern    Not on file   Social History Narrative    Not on file           EXAMINATION, COMPREHENSIVE:       Constitutional:    Vitals:    01/17/20 0808 01/17/20 0813   BP: (!) 146/97 (!) 139/94   Pulse: 84 82   Weight: 174 lb (78.9 kg)    Height: 5' 4\" (1.626 m)         · VITALS SIGNS were reviewed. · GENERAL APPEARANCE:  Well developed, well nourished, no apparent distress. · ABILITY TO COMMUNICATE/QUALITY OF VOICE:  Communicated without difficulty. Normal voice. Head and Face:  · INSPECTION:  Normocephalic. No evidence of trauma. No tenderness. Normal overall appearance with no scars, lesions or masses. · SINUSES:  The maxillary and frontal sinuses were nontender, bilaterally. · SALIVARY GLANDS:  Parotid, submandibular and sublingual glands normal.  · FACIAL STRENGTH, MOTION:  Normal and equal for all five branches bilaterally. Eyes:   · EXTRAOCULAR MOTION:  intact, normal primary gaze alignment. No nystagmus at any point of gaze. Ears, nose, mouth, & throat:  · HEARING ASSESSMENT:  Able to hear finger rub bilaterally. Tuning fork tests, using a 512 Hertz tuning fork:  [place] midline. Rinne air > bone bilaterally. · EXTERNAL EAR/NOSE:  Normal pinnae and mastoids. Normal external nose. · EARS,  OTOMICROSCOPY:  The TMs and EACs appeared to be normal bilaterally, including normal TM mobility bilaterally. · NOSE:  The nasal septum was midline. The nasal mucosa, inferior turbinates, and secretions were normal.  No pus or polyps were seen. · LIPS, TEETH AND GUMS:  Normal.    · OROPHARYNX/ORAL CAVITY:  The palatine tonsils were normal.  Oral mucosa, hard and soft palates, tongue, and pharynx were normal.    · INSPECTION OF PHARYNGEAL WALLS AND PYRIFORM SINUSES:  Normal bilaterally, with no pooling of saliva, masses, asymmetry or lesions.   · (+) INDIRECT LARYNGOSCOPY: Visualization was suboptimal due to a strong gag reflex and guarding. The base of tongue and epiglottis appeared to be normal.  Unable to visualize the vocal cords and hypopharynx, and endolarynx. · (+) MIRROR EXAM OF NASOPHARYNX:  Visualization was suboptimal due to a strong gag reflex and guarding. No masses or polyps were appreciated. But, I was unable to visualize the nasopharynx adequately. NECK:   · No masses or tenderness. No carotid bruits, abnormal appearance, asymmetry or tracheal deviation. Laryngeal cartilages and hyoid bone were normal to palpation, with normal laryngeal crepitus. THYROID:    · No nodules, enlargement, tenderness or masses. RESPIRATORY:  · CHEST, INSPECTION:  Normal symmetrical expansion, and respiratory effort. · LUNGS, AUSCULTATION:  clear, with normal breath sounds and no rales, rhonchi, or rubs. Cardiovascular:  · HEART, AUSCULTATION:  normal S1 and S2. No abnormal sounds or murmurs. No carotid bruits. Lymphatic:   · PALPATION OF LYMPH NODES, CERVICAL, FACIAL AND SUPRACLAVICULAR: Nontender, No lymphadenopathy. Neurological/Psychiatric:    · CRANIAL NERVES:  II - XII intact, with no apparent deficits. · ORIENTATION TO TIME, PLACE, PRESIDENT, AND PERSON:  Oriented x 3.  · MOOD AND AFFECT:  Normal.  No evidence of depression, anxiety or agitation. LABS:    PTHResulted: 11/13/2019 4:12 PM   Health  Component Name Value Ref Range   PTH 93.0 (H) 12 - 88 pg/mL   Specimen Collected on   Plasma 11/13/2019 7:15 AM         Calcium 9.9  8.3 - 10.6 mg/dL Final 01/21/2019  3:26 PM [facility]       Calcium 9.2  8.5 - 10.1 mg/dL Final 12/06/2018  9:59 AM N       Calcium 10.5  8.3 - 10.6 mg/dL Final 11/29/2018  8:43 AM  - [facility]         REVIEW OF IMAGES       I independently reviewed the images of the parathyroid scan showing probable left parathyroid adenoma.    Narrative   EXAMINATION:   NUCLEAR MEDICINE PARATHYROID SCINTIGRAPHY complications, including, but not limited to, excessive intraoperative or postoperative bleeding, hemorrhage, infection, numbness of skin, vocal cord paralysis, permanent hoarseness or change in or loss of voice, airway obstruction, injury to surrounding structures or organs, injury to major blood vessels or nerves, excessive scarring, deformity, poor (incomplete or lack of) wound healing, pain, discomfort,  recurrence of cancer, need for further surgery, and risks of anesthesia, including heart attack, cardiac arrest, stroke, blood clots in lower extremity veins, pulmonary embolism, and death. All of Eve's questions were answered. Moshe Joshua then stated, expressed, and confirmed understanding and acceptance of the preceding, lack of further questions and the desire to proceed with surgery. IMPRESSION / Becky Reasons / Corey Plata:       Briseyda Oh was seen today for other. Diagnoses and all orders for this visit:    Hyperparathyroidism ([address]  -     CALCIUM; Future  -     TSH without Reflex; Future  -     T3, FREE; Future  -     T4, FREE    Parathyroid adenoma  Comments:  positive Sestamibi scan, left lower   Orders:  -     CALCIUM; Future  -     TSH without Reflex; Future  -     T3, FREE; Future  -     T4, FREE    Bilateral hearing loss, unspecified hearing loss type    Subjective tinnitus of both ears        RECOMMENDATIONS / PLAN:  1. Dannydaniel Oh agreed to follow up with Dr. Adelina Goodwin, APRN - CNP regarding any non-ENT related positives in the review of systems. 2. Audiogram.    3. Exploration for and excision of left parathyroid adenoma, if found. 4. Recommend patient see Dr. Kenny Blue,  prior to surgery, for endocrinology opinion and input. 5. Return for post op check.        >>> Please note that this note was completed using Dragon voice recognition transcription. Every effort was made to ensure accuracy; however, inadvertent  transcription errors may be present despite my best efforts to edit errors.

## 2020-01-20 ENCOUNTER — HOSPITAL ENCOUNTER (OUTPATIENT)
Dept: WOMENS IMAGING | Age: 70
Discharge: HOME | End: 2020-01-20
Payer: COMMERCIAL

## 2020-01-20 PROCEDURE — 77063 BREAST TOMOSYNTHESIS BI: CPT

## 2020-01-28 ENCOUNTER — TELEPHONE (OUTPATIENT)
Dept: ENT CLINIC | Age: 70
End: 2020-01-28

## 2020-01-28 PROBLEM — H93.13 SUBJECTIVE TINNITUS OF BOTH EARS: Status: ACTIVE | Noted: 2020-01-28

## 2020-01-28 PROBLEM — H91.93 BILATERAL HEARING LOSS: Status: ACTIVE | Noted: 2020-01-28

## 2020-01-28 NOTE — TELEPHONE ENCOUNTER
Patient informed of referral to Dr. Yolette Sidhu, and that Dr. Fabrizio Nichole would like her to be seen ASAP.

## 2020-02-10 ENCOUNTER — OFFICE VISIT (OUTPATIENT)
Dept: ENDOCRINOLOGY | Age: 70
End: 2020-02-10
Payer: COMMERCIAL

## 2020-02-10 VITALS
BODY MASS INDEX: 28.68 KG/M2 | SYSTOLIC BLOOD PRESSURE: 125 MMHG | WEIGHT: 168 LBS | HEIGHT: 64 IN | HEART RATE: 101 BPM | DIASTOLIC BLOOD PRESSURE: 88 MMHG

## 2020-02-10 PROCEDURE — 99243 OFF/OP CNSLTJ NEW/EST LOW 30: CPT | Performed by: INTERNAL MEDICINE

## 2020-02-10 NOTE — PATIENT INSTRUCTIONS
INTRODUCTION -- The following instructions will guide you in the proper collection of a 24-hour urine specimen. In some instances, you will be asked to collect two or three consecutive 24-hour urine samples. INSTRUCTIONS  ? You should collect every drop of urine during each 24-hour period. It does not matter how much or little urine is passed each time, as long as every drop is collected. ? Begin the urine collection in the morning after you wake up, after you have emptied your bladder for the first time. ? Urinate (empty the bladder) for the first time and flush it down the toilet. Note the exact time (eg, 6:15 AM). You will begin the urine collection at this time. ?Collect every drop of urine during the day and night in an empty collection bottle. Store the bottle at room temperature or in the refrigerator. ?If you need to have a bowel movement, any urine passed with the bowel movement should be collected. Try not to include feces with the urine collection. If feces does get mixed in, do not try to remove the feces from the urine collection bottle. ? Finish by collecting the first urine passed the next morning, adding it to the collection bottle. This should be within ten minutes before or after the time of the first morning void on the first day (which was flushed). In this example, you would try to void between 6:05 and 6:25 on the second day. If you need to urinate one hour before the final collection time, drink a full glass of water so that you can void again at the appropriate time. If you have to urinate 20 minutes before, try to hold the urine until the proper time. Please note the exact time of the final collection, even if it is not the same time as when collection began on day 1. STORAGE -- The bottle(s) may be kept at room temperature for a day or two, but should be kept cool or refrigerated for longer periods of time.   WHERE TO GET MORE INFORMATION -- Your healthcare provider is the best source of information for questions and concerns related to your medical problem.

## 2020-02-10 NOTE — PROGRESS NOTES
Olivia Smith is referred to me by Dr Fidel Rai hyperparathyroidism. Jeri Anderson was diagnosed with hypercalcemia at her preop screening in October 2019 for knee surgery   . Patient doesn't have any history of kidney stones she denies any hematuria . Jeri Anderson  denies  nonspecific symptoms, such as constipation, fatigue, and depression often associated with hypercalcemia . denies any issues with memory. She does complain of pain in her left leg muscles since October 2019 patient denies any family history of hypercalcemia or MEN syndrome. She does not have any siblings she was raised by her maternal aunt  Patient had issues with lumbar spine and is status post lumbar decompression of the L2-L3 and L4 in December 2018. Patient also has hypertension and is on Lotensin and Norvasc  She is treated for gastroesophageal reflux disease with Nexium. Patient appears to have severe osteoarthritis and has undergone bilateral knee replacements and has issue with spine for years. .  She is noted to have elevated creatinine 1.4 in December 2018 which is improved to her baseline creatinine of 1.1    Chief Complaint   Patient presents with    New Patient     parathyroid adenoma referred by Dr. Srinivasa Morales          Past Medical History:   Diagnosis Date    Arthritis     [facility]cere[facility] vascular accident) [facility] [address]     GERD (gastroesophageal reflux disease)     High blood pressure     Hyperlipidemia     Osteoarthritis          Past Surgical History:   Procedure Laterality Date    HYSTERECTOMY  1997    KNEE ARTHROSCOPY  1/2001    right       Family History   Problem Relation Age of Onset    Cancer Mother     High Blood Pressure Mother        Social History     Socioeconomic History    Marital status:      Spouse name: Not on file    Number of children: Not on file    Years of education: Not on file    Highest education level: Not on file   Occupational History    Occupation:    Social Needs    normal  Skin: skin and subcutaneous tissue and normal without rashes or lesions and palpation of skin and subcutaneous tissue is normal without mass, normal turgor  Head and Face: examination of head and face revealed no abnormalities  Eyes: no lid or conjunctival swelling, erythema or discharge and pupils are normal, equal, round, and reactive to light  Ears/Nose: external inspection of ears and nose revealed no abnormalities and hearing is grossly normal  Oropharynx/Mouth/Face: lips, tongue and gums are normal with no lesions and the voice quality was normal  Neck: neck is supple and symmetric, with midline trachea and no masses and thyroid is normal with no enlargement or masses  Lymphatics: normal cervical lymph nodes, normal supraclavicular nodes  Pulmonary: no increased work of breathing or signs of respiratory distress and lungs are clear to auscultation  Cardiovascular: normal heart rate and rhythm, normal S1 and S2, no murmurs and pedal pulses and 2+ bilaterally  Abdomen: abdomen is soft, non-tender with no masses  Musculoskeletal: normal gait and station  Neurological Coordination: normal coordination and General Cortical Function: normal      ROS   I have reviewed the review of system questionnaire filled by the patient . Patient was advised to contact PCP for non endocrine signs and symptoms       ASSESSMENT/PLAN     1. Hyperparathyroidism last calcium level of 10.2 with a PTH of 93 in November 2019 there was no concomitant vitamin D drawn at that time. Patient is noted to have mild borderline high calcium since 2015  She has been taking 3000 IUs of vitamin D for years. I will rule out [address] by doing 24-hour urine calcium, patient was advised to increase her vitamin D supplementation by 1 extra tablet for the next 2 weeks and then get her 24-hour urine collection. I explained the 24-hour urine collection process in detail.   She will get blood work drawn on the day that she brings her 24-hour urine specimen back  She was also advised to get a DEXA scan scheduled  If she is noted to have elevated 24-hour urine calcium and osteoporosis she would most likely benefit from parathyroid surgery as her creatinine is also affected which might improve after the surgery. Patient will get all the testing done in 2 weeks and call back for the results  Meanwhile the patient is advised to avoid factors that can aggravate hypercalcemia including thiazide diuretic and lithium carbonate therapy, volume depletion, prolonged bed rest or inactivity, and a high-calcium diet (>1000 mg/day). ?Encourage physical activity to minimize bone resorption. ? Encourage adequate hydration (at least six to eight glasses of water per day) to minimize the risk of nephrolithiasis. ?Maintain a moderate dietary calcium intake (1000 mg/day)  --- I have ordered the following labs and imaging    - Vitamin D 25 Hydroxy; Future  - PTH, Intact; Future  - Vitamin D 1,25 Dihydroxy; Future  - Calcium, 24 HR Urine; Future  - Creatinine, 24 HR Urine; Future  - Phosphorus; Future  - DEXA Bone Density Axial Skeleton; Future    2. Gastroesophageal reflux disease without esophagitis  Patient is on medication this is well controlled    3. Severe osteoarthritis  Patient already has undergone bilateral knee replacement and is also struggling with lumbar vertebra and had surgery done in the past    4. Parathyroid adenoma left  She is already established with Dr. Kurt Celaya and is possibly undergoing surgery in March      5. HTN (hypertension), benign  Blood pressure control is adequate on the current therapy  Continue with the current regimen      7. Postmenopausal  I have ordered DEXA scan  I had a lengthy discussion about optimization of vitamin D and calcium supplementation  - DEXA Bone Density Axial Skeleton;  Future      I spent  45 + minutes with patient and more than 50 % of this time was spent discussing and providing counseling and coordinating care of patient's multiple health issues. Please note that some or all of this report was generated using voice recognition software. Please notify me in case of any questions about the content of this document, as some errors in transcription may have occurred .

## 2020-02-17 ENCOUNTER — HOSPITAL ENCOUNTER (OUTPATIENT)
Dept: GENERAL RADIOLOGY | Age: 70
Discharge: HOME | End: 2020-02-17
Payer: COMMERCIAL

## 2020-02-17 PROCEDURE — 77080 DXA BONE DENSITY AXIAL: CPT

## 2020-02-24 DIAGNOSIS — I10 ESSENTIAL HYPERTENSION: ICD-10-CM

## 2020-02-24 DIAGNOSIS — M81.0 AGE-RELATED OSTEOPOROSIS WITHOUT CURRENT PATHOLOGICAL FRACTURE: ICD-10-CM

## 2020-02-24 DIAGNOSIS — I10 HTN (HYPERTENSION), BENIGN: ICD-10-CM

## 2020-02-24 DIAGNOSIS — N18.2 CHRONIC RENAL IMPAIRMENT, STAGE 2 (MILD): ICD-10-CM

## 2020-02-24 DIAGNOSIS — D35.1 PARATHYROID ADENOMA: ICD-10-CM

## 2020-02-24 DIAGNOSIS — E21.3 HYPERPARATHYROIDISM (HCC): ICD-10-CM

## 2020-02-24 DIAGNOSIS — D35.1 PARATHYROID ADENOMA: Chronic | ICD-10-CM

## 2020-02-24 LAB
24HR URINE VOLUME (ML): 1900 ML
ANION GAP SERPL CALCULATED.3IONS-SCNC: 12 MMOL/L (ref 3–16)
BUN BLDV-MCNC: 19 MG/DL (ref 7–20)
CALCIUM 24 HOUR URINE: 329 MG/24 HR (ref 42–353)
CALCIUM SERPL-MCNC: 10.9 MG/DL (ref 8.3–10.6)
CHLORIDE BLD-SCNC: 111 MMOL/L (ref 99–110)
CO2: 22 MMOL/L (ref 21–32)
CREAT SERPL-MCNC: 1.2 MG/DL (ref 0.6–1.2)
CREATININE 24 HOUR URINE: 1.2 G/24HR (ref 0.6–1.5)
GFR AFRICAN AMERICAN: 54
GFR NON-AFRICAN AMERICAN: 45
GLUCOSE BLD-MCNC: 93 MG/DL (ref 70–99)
PARATHYROID HORMONE INTACT: 97.2 PG/ML (ref 14–72)
PHOSPHORUS: 2.9 MG/DL (ref 2.5–4.9)
POTASSIUM SERPL-SCNC: 4.6 MMOL/L (ref 3.5–5.1)
SODIUM BLD-SCNC: 145 MMOL/L (ref 136–145)
TSH SERPL DL<=0.05 MIU/L-ACNC: 3.21 UIU/ML (ref 0.27–4.2)
VITAMIN D 25-HYDROXY: 32.8 NG/ML

## 2020-02-25 LAB
IGA: 190 MG/DL (ref 70–400)
TISSUE TRANSGLUTAMINASE IGA: <0.5 U/ML (ref 0–14)

## 2020-02-26 ENCOUNTER — TELEPHONE (OUTPATIENT)
Dept: ENDOCRINOLOGY | Age: 70
End: 2020-02-26

## 2020-02-26 LAB
C TELOPEPTIDE: 1056 PG/ML
VITAMIN D 1,25-DIHYDROXY: 42 PG/ML (ref 19.9–79.3)

## 2020-02-27 LAB — T3 FREE: 2.5 PG/ML (ref 2.3–4.2)

## 2020-03-02 RX ORDER — ALENDRONATE SODIUM 70 MG/1
TABLET ORAL
Qty: 4 TABLET | Refills: 3 | Status: SHIPPED | OUTPATIENT
Start: 2020-03-02

## 2020-03-09 ENCOUNTER — TELEPHONE (OUTPATIENT)
Dept: ENT CLINIC | Age: 70
End: 2020-03-09

## 2020-03-09 NOTE — TELEPHONE ENCOUNTER
Patient called and is wondering if we have received her FMLA paper work it is due on 3-16-20      Lori please check and then call the patient back if she needs to refax over the paperwork

## 2020-03-10 ENCOUNTER — TELEPHONE (OUTPATIENT)
Dept: ENT CLINIC | Age: 70
End: 2020-03-10

## 2020-03-10 NOTE — TELEPHONE ENCOUNTER
I called the patient to inform her that Dr. Rachel Farrar will fill out after her surgery.    She said that her boss told her they had to be filled out and received by the 16 th.

## 2021-06-15 ENCOUNTER — HOSPITAL ENCOUNTER (OUTPATIENT)
Dept: MAMMOGRAPHY | Age: 71
Discharge: HOME | End: 2021-06-15
Payer: COMMERCIAL

## 2021-06-15 VITALS — BODY MASS INDEX: 30.56 KG/M2 | HEIGHT: 64 IN | WEIGHT: 179 LBS

## 2021-06-15 DIAGNOSIS — Z12.31 ENCOUNTER FOR SCREENING MAMMOGRAM FOR BREAST CANCER: ICD-10-CM

## 2021-06-15 PROCEDURE — 77063 BREAST TOMOSYNTHESIS BI: CPT

## 2022-08-05 ENCOUNTER — HOSPITAL ENCOUNTER (OUTPATIENT)
Dept: MAMMOGRAPHY | Age: 72
Discharge: HOME | End: 2022-08-05
Payer: COMMERCIAL

## 2022-08-05 VITALS — HEIGHT: 64 IN | BODY MASS INDEX: 30.56 KG/M2 | WEIGHT: 179 LBS

## 2022-08-05 DIAGNOSIS — Z12.31 BREAST CANCER SCREENING BY MAMMOGRAM: ICD-10-CM

## 2022-08-05 PROCEDURE — 77063 BREAST TOMOSYNTHESIS BI: CPT

## 2022-09-12 ENCOUNTER — HOSPITAL ENCOUNTER (OUTPATIENT)
Dept: WOMENS IMAGING | Age: 72
Discharge: HOME | End: 2022-09-12
Payer: COMMERCIAL

## 2022-09-12 ENCOUNTER — HOSPITAL ENCOUNTER (OUTPATIENT)
Dept: ULTRASOUND IMAGING | Age: 72
End: 2022-09-12
Payer: COMMERCIAL

## 2022-09-12 ENCOUNTER — HOSPITAL ENCOUNTER (OUTPATIENT)
Age: 72
Discharge: HOME | End: 2022-09-12
Payer: COMMERCIAL

## 2022-09-12 DIAGNOSIS — R92.8 ABNORMAL MAMMOGRAM: ICD-10-CM

## 2022-09-12 LAB
ANION GAP SERPL CALCULATED.3IONS-SCNC: 15 MMOL/L (ref 3–16)
BUN BLDV-MCNC: 16 MG/DL (ref 7–20)
CALCIUM SERPL-MCNC: 9.3 MG/DL (ref 8.3–10.6)
CHLORIDE BLD-SCNC: 109 MMOL/L (ref 99–110)
CO2: 19 MMOL/L (ref 21–32)
CREAT SERPL-MCNC: 1.2 MG/DL (ref 0.6–1.2)
GFR AFRICAN AMERICAN: 54
GFR NON-AFRICAN AMERICAN: 44
GLUCOSE BLD-MCNC: 101 MG/DL (ref 70–99)
POTASSIUM SERPL-SCNC: 3.7 MMOL/L (ref 3.5–5.1)
SODIUM BLD-SCNC: 143 MMOL/L (ref 136–145)

## 2022-09-12 PROCEDURE — G0279 TOMOSYNTHESIS, MAMMO: HCPCS

## 2022-09-12 PROCEDURE — 36415 COLL VENOUS BLD VENIPUNCTURE: CPT

## 2022-09-12 PROCEDURE — 80048 BASIC METABOLIC PNL TOTAL CA: CPT

## 2023-10-19 ENCOUNTER — HOSPITAL ENCOUNTER (OUTPATIENT)
Dept: MAMMOGRAPHY | Age: 73
Discharge: HOME | End: 2023-10-19
Payer: COMMERCIAL

## 2023-10-19 DIAGNOSIS — Z12.31 BREAST CANCER SCREENING BY MAMMOGRAM: ICD-10-CM

## 2023-10-19 PROCEDURE — 77063 BREAST TOMOSYNTHESIS BI: CPT

## 2023-11-14 ENCOUNTER — HOSPITAL ENCOUNTER (OUTPATIENT)
Dept: WOMENS IMAGING | Age: 73
Discharge: HOME | End: 2023-11-14
Payer: COMMERCIAL

## 2023-11-14 DIAGNOSIS — R92.8 ABNORMAL MAMMOGRAM OF RIGHT BREAST: ICD-10-CM

## 2023-11-14 PROCEDURE — G0279 TOMOSYNTHESIS, MAMMO: HCPCS

## 2025-02-17 ENCOUNTER — HOSPITAL ENCOUNTER (OUTPATIENT)
Dept: MAMMOGRAPHY | Age: 75
Discharge: HOME | End: 2025-02-17
Payer: MEDICARE

## 2025-02-17 DIAGNOSIS — Z12.31 ENCOUNTER FOR SCREENING MAMMOGRAM FOR BREAST CANCER: ICD-10-CM

## 2025-02-17 PROCEDURE — 77067 SCR MAMMO BI INCL CAD: CPT
